# Patient Record
Sex: FEMALE | Race: WHITE | NOT HISPANIC OR LATINO | URBAN - METROPOLITAN AREA
[De-identification: names, ages, dates, MRNs, and addresses within clinical notes are randomized per-mention and may not be internally consistent; named-entity substitution may affect disease eponyms.]

---

## 2017-01-01 ENCOUNTER — EMERGENCY (EMERGENCY)
Facility: HOSPITAL | Age: 82
LOS: 1 days | End: 2017-01-01
Attending: EMERGENCY MEDICINE | Admitting: EMERGENCY MEDICINE
Payer: MEDICARE

## 2017-01-01 ENCOUNTER — EMERGENCY (EMERGENCY)
Facility: HOSPITAL | Age: 82
LOS: 1 days | Discharge: HOME | End: 2017-01-01
Attending: EMERGENCY MEDICINE | Admitting: EMERGENCY MEDICINE
Payer: MEDICARE

## 2017-01-01 VITALS — TEMPERATURE: 102 F | HEIGHT: 62 IN | WEIGHT: 89.95 LBS

## 2017-01-01 VITALS
OXYGEN SATURATION: 98 % | SYSTOLIC BLOOD PRESSURE: 123 MMHG | RESPIRATION RATE: 16 BRPM | TEMPERATURE: 98 F | DIASTOLIC BLOOD PRESSURE: 77 MMHG | HEART RATE: 67 BPM

## 2017-01-01 VITALS
SYSTOLIC BLOOD PRESSURE: 133 MMHG | WEIGHT: 121.03 LBS | RESPIRATION RATE: 18 BRPM | DIASTOLIC BLOOD PRESSURE: 78 MMHG | HEIGHT: 61 IN | OXYGEN SATURATION: 96 % | HEART RATE: 78 BPM | TEMPERATURE: 97 F

## 2017-01-01 PROCEDURE — 72125 CT NECK SPINE W/O DYE: CPT | Mod: 26

## 2017-01-01 PROCEDURE — 92950 HEART/LUNG RESUSCITATION CPR: CPT

## 2017-01-01 PROCEDURE — 99284 EMERGENCY DEPT VISIT MOD MDM: CPT | Mod: 25

## 2017-01-01 PROCEDURE — 12002 RPR S/N/AX/GEN/TRNK2.6-7.5CM: CPT

## 2017-01-01 PROCEDURE — 70450 CT HEAD/BRAIN W/O DYE: CPT | Mod: 26

## 2017-01-01 PROCEDURE — 99285 EMERGENCY DEPT VISIT HI MDM: CPT | Mod: 25

## 2017-01-01 PROCEDURE — 72125 CT NECK SPINE W/O DYE: CPT

## 2017-01-01 PROCEDURE — 70450 CT HEAD/BRAIN W/O DYE: CPT

## 2017-03-11 NOTE — ED PROVIDER NOTE - OBJECTIVE STATEMENT
89 y/o female aide says she was in the bathroom with the patient and as the aide was reaching for a diaper the patient struck her post scalp against a door and sustained a lac on the back of her head the pt is [place] but c/o of only post scalp pain and denies neck pain or any other pain aide says no loc and no change in level of consciousness or any other injury or c/o other than lac on post scalp aide says no anticoagulants or antiplatelet agents

## 2017-03-11 NOTE — ED ADULT NURSE NOTE - PMH
Atrial fibrillation  as per daughters at bed side pt. has no h/o afib and was never on coumadin.  CVA, old, dysphagia    Dementia    Hyperlipidemia, unspecified hyperlipidemia type    Hypertension

## 2017-03-11 NOTE — ED ADULT TRIAGE NOTE - CHIEF COMPLAINT QUOTE
pt BIBA s.p fall as per aid, pt awake at baseline mental status, hx dementia. no use of blood thinners as per aid, lac to head. no loc

## 2017-03-11 NOTE — ED ADULT NURSE NOTE - OBJECTIVE STATEMENT
pt presents to ED with laceration noted to back on head s/p slipped while sitting in the chair and hit her head on the door. denies falling to the floor. pt was with aide when it happened. pt denies LOC. denies n/v. bleeding controlled. denies use of anticoagulants. breathing si even and unlabored. awake and alert. PERRL. maex4 lungs cta. den

## 2017-03-11 NOTE — ED PROVIDER NOTE - MEDICAL DECISION MAKING DETAILS
fall with small lac will tx and check ct and if ok d/c and rec f/u pmd fall with small lac will repair and check ct and if ok d/c and rec f/u pmd

## 2017-03-11 NOTE — ED PROVIDER NOTE - SKIN, MLM
Skin normal color for race, warm, post scalp superficial lac Skin normal color for race, warm, post scalp laceration linear 3 cm occipital scalp

## 2017-03-11 NOTE — ED PROVIDER NOTE - PROGRESS NOTE DETAILS
daughter present and consent to lac repair and ct head and neck and will ensure f/u with pmd aware staples will need to come out in 10 days

## 2017-07-29 NOTE — DISCHARGE NOTE FOR THE EXPIRED PATIENT - REASON FOR ADMISSION
pt biba, witnessed cardiac arrest by ems, cpr started at 2311, thania airway placed by emt, IO in lle, acls meds admin with no response

## 2017-07-29 NOTE — ED PROVIDER NOTE - OBJECTIVE STATEMENT
89 y/o F pt presents to ED BIBA in cardiac arrest. CPR in progress, 1 amp bicarb administered and 6 amp epi administered. Per EMS approximate downtime 40 minutes.  Pt intubated in route Pt has been in PEA throughout route. Per EMS pt's family/aide states pt wasn't feeling well today and having respiratory difficulties.

## 2017-07-29 NOTE — ED PROVIDER NOTE - MEDICAL DECISION MAKING DETAILS
extended downtim in field entital less than 20 no cardia acitivity on u/s pt prounounced sylvia on arrival family updated on pts death

## 2017-07-29 NOTE — DISCHARGE NOTE FOR THE EXPIRED PATIENT - HOSPITAL COURSE
pt biba @ 4113, pea, echo shows no cardiac activity, pt  @ 5301, family made aware, pt sent to [facility] @ 5348
